# Patient Record
Sex: FEMALE | Race: WHITE | NOT HISPANIC OR LATINO | URBAN - METROPOLITAN AREA
[De-identification: names, ages, dates, MRNs, and addresses within clinical notes are randomized per-mention and may not be internally consistent; named-entity substitution may affect disease eponyms.]

---

## 2021-12-25 ENCOUNTER — EMERGENCY (EMERGENCY)
Facility: HOSPITAL | Age: 61
LOS: 0 days | Discharge: HOME | End: 2021-12-25
Attending: EMERGENCY MEDICINE | Admitting: EMERGENCY MEDICINE
Payer: COMMERCIAL

## 2021-12-25 VITALS
RESPIRATION RATE: 19 BRPM | TEMPERATURE: 99 F | SYSTOLIC BLOOD PRESSURE: 162 MMHG | WEIGHT: 147.93 LBS | HEART RATE: 76 BPM | DIASTOLIC BLOOD PRESSURE: 102 MMHG | OXYGEN SATURATION: 96 %

## 2021-12-25 VITALS
HEART RATE: 72 BPM | OXYGEN SATURATION: 98 % | RESPIRATION RATE: 18 BRPM | SYSTOLIC BLOOD PRESSURE: 152 MMHG | DIASTOLIC BLOOD PRESSURE: 88 MMHG

## 2021-12-25 DIAGNOSIS — Z90.710 ACQUIRED ABSENCE OF BOTH CERVIX AND UTERUS: Chronic | ICD-10-CM

## 2021-12-25 DIAGNOSIS — Z94.6 BONE TRANSPLANT STATUS: Chronic | ICD-10-CM

## 2021-12-25 DIAGNOSIS — Z88.0 ALLERGY STATUS TO PENICILLIN: ICD-10-CM

## 2021-12-25 DIAGNOSIS — Z91.018 ALLERGY TO OTHER FOODS: ICD-10-CM

## 2021-12-25 DIAGNOSIS — S61.412A LACERATION WITHOUT FOREIGN BODY OF LEFT HAND, INITIAL ENCOUNTER: ICD-10-CM
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
## 2021-12-25 DIAGNOSIS — Z91.013 ALLERGY TO SEAFOOD: ICD-10-CM

## 2021-12-25 DIAGNOSIS — Z88.8 ALLERGY STATUS TO OTHER DRUGS, MEDICAMENTS AND BIOLOGICAL SUBSTANCES: ICD-10-CM

## 2021-12-25 DIAGNOSIS — Z23 ENCOUNTER FOR IMMUNIZATION: ICD-10-CM

## 2021-12-25 DIAGNOSIS — Z98.890 OTHER SPECIFIED POSTPROCEDURAL STATES: Chronic | ICD-10-CM

## 2021-12-25 DIAGNOSIS — Z90.49 ACQUIRED ABSENCE OF OTHER SPECIFIED PARTS OF DIGESTIVE TRACT: Chronic | ICD-10-CM
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
## 2021-12-25 DIAGNOSIS — Z98.891 HISTORY OF UTERINE SCAR FROM PREVIOUS SURGERY: Chronic | ICD-10-CM

## 2021-12-25 PROCEDURE — 73130 X-RAY EXAM OF HAND: CPT | Mod: 26,LT

## 2021-12-25 PROCEDURE — 12001 RPR S/N/AX/GEN/TRNK 2.5CM/<: CPT

## 2021-12-25 PROCEDURE — 99283 EMERGENCY DEPT VISIT LOW MDM: CPT | Mod: 25

## 2021-12-25 RX ORDER — TETANUS TOXOID, REDUCED DIPHTHERIA TOXOID AND ACELLULAR PERTUSSIS VACCINE, ADSORBED 5; 2.5; 8; 8; 2.5 [IU]/.5ML; [IU]/.5ML; UG/.5ML; UG/.5ML; UG/.5ML
0.5 SUSPENSION INTRAMUSCULAR ONCE
Refills: 0 | Status: COMPLETED | OUTPATIENT
Start: 2021-12-25 | End: 2021-12-25

## 2021-12-25 RX ADMIN — TETANUS TOXOID, REDUCED DIPHTHERIA TOXOID AND ACELLULAR PERTUSSIS VACCINE, ADSORBED 0.5 MILLILITER(S): 5; 2.5; 8; 8; 2.5 SUSPENSION INTRAMUSCULAR at 14:16

## 2021-12-25 NOTE — ED PROVIDER NOTE - NS ED ROS FT
CONST: No fever, chills or bodyaches  EYES: No pain, redness, drainage or visual changes.  ENT: No ear pain or discharge, nasal discharge or congestion. No sore throat  CARD: No chest pain, palpitations  RESP: No SOB, cough, hemoptysis. No hx of asthma or COPD  GI: No abdominal pain, N/V/D  : No urinary symptoms  MS: No joint pain, back pain or extremity pain/injury  SKIN: No rashes. (+) left hand laceration  NEURO: No headache, dizziness, paresthesias or LOC

## 2021-12-25 NOTE — ED PROVIDER NOTE - PROGRESS NOTE DETAILS
FF: pt laceration was anesthetized, cleansed and repaired. dressing placed. pt advised of return precautions discussed at bedside. agreeable to dc. advised to return in 10-12 days for suture removal.

## 2021-12-25 NOTE — ED PROVIDER NOTE - OBJECTIVE STATEMENT
62 y/o female with no significant PMH presents to the ED for evaluation of left hand laceration s/p a glass breaking and a piece of it cut her left hand. pt cannot recall her last tetanus vaccine. pt denies weakness, numbness, or tingling.

## 2021-12-25 NOTE — ED PROVIDER NOTE - CLINICAL SUMMARY MEDICAL DECISION MAKING FREE TEXT BOX
60 y/o female with no significant PMH presents to the ED for evaluation of left hand laceration s/p a glass breaking and a piece of it cut her left hand. Pt cannot recall her last tetanus vaccine. Pt denies weakness, numbness, or tingling. Agree with PA's exam. Lac repaired. WIll d/c.

## 2021-12-25 NOTE — ED PROVIDER NOTE - PHYSICAL EXAMINATION
Physical Exam    Vital Signs: I have reviewed the initial vital signs.  Constitutional: well-nourished, appears stated age, no acute distress  Eyes: Conjunctiva pink, Sclera clear  Cardiovascular: S1 and S2, regular rate, regular rhythm, well-perfused extremities, radial pulses equal and 2+ b/l. good capillary refill <2 seconds.   Respiratory: unlabored respiratory effort, clear to auscultation bilaterally no wheezing, rales and rhonchi. pt is speaking full sentences. no accessory muscle use.   Musculoskeletal: FROM of digits b/l.  Integumentary: warm, dry, no rash. ~2cm laceration to the left distal 5th metacarpal with no visible foreign body. no tendon involvement.   Neurologic: awake, alert, cranial nerves II-XII grossly intact, extremities’ motor and sensory functions grossly intact. median, radial, and ulnar nerve motor and sensory function grossly intact b/l. steady gait.   Psychiatric: appropriate mood, appropriate affect

## 2021-12-25 NOTE — ED PROVIDER NOTE - PATIENT PORTAL LINK FT
You can access the FollowMyHealth Patient Portal offered by [facility] by registering at the following website: [email or web address]. By joining [facility]’s FollowMyHealth portal, you will also be able to view your health information using other applications (apps) compatible with our system.

## 2021-12-25 NOTE — ED ADULT TRIAGE NOTE - WEIGHT IN LBS
147.9 TOKEN:'[id number]',FREE:[LAST:[Rena],FIRST:[],PHONE:[(   )    -],FAX:[(   )    -],ADDRESS:[PCP]]

## 2021-12-25 NOTE — ED ADULT NURSE NOTE - NSICDXPASTSURGICALHX_GEN_ALL_CORE_FT
PAST SURGICAL HISTORY:  H/O bone transplant     H/O colectomy     H/O myomectomy     H/O: hysterectomy     S/P

## 2021-12-25 NOTE — ED PROVIDER NOTE - ATTENDING CONTRIBUTION TO CARE
62 y/o female with no significant PMH presents to the ED for evaluation of left hand laceration s/p a glass breaking and a piece of it cut her left hand. Pt cannot recall her last tetanus vaccine. Pt denies weakness, numbness, or tingling. Agree with PA's exam. Lac repaired. WIll d/c.

## 2021-12-25 NOTE — ED PROVIDER NOTE - NSFOLLOWUPINSTRUCTIONS_ED_ALL_ED_FT
Laceration    A laceration is a cut that goes through all of the layers of the skin and into the tissue that is right under the skin. Some lacerations heal on their own. Others need to be closed with stitches (sutures), staples, skin adhesive strips, or skin glue. Proper laceration care minimizes the risk of infection and helps the laceration to heal better.     SEEK IMMEDIATE MEDICAL CARE IF YOU HAVE THE FOLLOWING SYMPTOMS: swelling around the wound, worsening pain, drainage from the wound, red streaking going away from your wound, inability to move finger or toe near the laceration, or discoloration of skin near the laceration.     RETURN FOR SUTURE REMOVAL IN 10-12 DAYS

## 2022-11-30 NOTE — ED ADULT NURSE NOTE - NS ED NURSE LEVEL OF CONSCIOUSNESS ORIENTATION
Addended by: VIKAS BLAKELY on: 11/30/2022 01:50 PM     Modules accepted: Orders    
Oriented - self; Oriented - place; Oriented - time

## 2023-06-12 ENCOUNTER — INPATIENT (INPATIENT)
Facility: HOSPITAL | Age: 63
LOS: 1 days | Discharge: HOME | DRG: 683 | End: 2023-06-14
Attending: STUDENT IN AN ORGANIZED HEALTH CARE EDUCATION/TRAINING PROGRAM | Admitting: INTERNAL MEDICINE
Payer: COMMERCIAL

## 2023-06-12 VITALS
TEMPERATURE: 98 F | SYSTOLIC BLOOD PRESSURE: 140 MMHG | RESPIRATION RATE: 18 BRPM | DIASTOLIC BLOOD PRESSURE: 81 MMHG | HEART RATE: 83 BPM | HEIGHT: 64 IN | OXYGEN SATURATION: 97 % | WEIGHT: 149.91 LBS

## 2023-06-12 DIAGNOSIS — Z98.890 OTHER SPECIFIED POSTPROCEDURAL STATES: Chronic | ICD-10-CM

## 2023-06-12 DIAGNOSIS — Z94.6 BONE TRANSPLANT STATUS: Chronic | ICD-10-CM

## 2023-06-12 DIAGNOSIS — Z90.710 ACQUIRED ABSENCE OF BOTH CERVIX AND UTERUS: Chronic | ICD-10-CM

## 2023-06-12 DIAGNOSIS — N17.9 ACUTE KIDNEY FAILURE, UNSPECIFIED: ICD-10-CM

## 2023-06-12 DIAGNOSIS — Z98.891 HISTORY OF UTERINE SCAR FROM PREVIOUS SURGERY: Chronic | ICD-10-CM

## 2023-06-12 DIAGNOSIS — Z90.49 ACQUIRED ABSENCE OF OTHER SPECIFIED PARTS OF DIGESTIVE TRACT: Chronic | ICD-10-CM

## 2023-06-12 PROBLEM — K51.90 ULCERATIVE COLITIS, UNSPECIFIED, WITHOUT COMPLICATIONS: Chronic | Status: ACTIVE | Noted: 2021-12-25

## 2023-06-12 LAB
ALBUMIN SERPL ELPH-MCNC: 4.5 G/DL — SIGNIFICANT CHANGE UP (ref 3.5–5.2)
ALP SERPL-CCNC: 91 U/L — SIGNIFICANT CHANGE UP (ref 30–115)
ALT FLD-CCNC: 16 U/L — SIGNIFICANT CHANGE UP (ref 0–41)
ANION GAP SERPL CALC-SCNC: 16 MMOL/L — HIGH (ref 7–14)
AST SERPL-CCNC: 22 U/L — SIGNIFICANT CHANGE UP (ref 0–41)
BASE EXCESS BLDV CALC-SCNC: -7.5 MMOL/L — LOW (ref -2–3)
BASOPHILS # BLD AUTO: 0.01 K/UL — SIGNIFICANT CHANGE UP (ref 0–0.2)
BASOPHILS NFR BLD AUTO: 0.2 % — SIGNIFICANT CHANGE UP (ref 0–1)
BILIRUB SERPL-MCNC: 0.4 MG/DL — SIGNIFICANT CHANGE UP (ref 0.2–1.2)
BUN SERPL-MCNC: 47 MG/DL — HIGH (ref 10–20)
C DIFF BY PCR RESULT: SIGNIFICANT CHANGE UP
CA-I SERPL-SCNC: 1.13 MMOL/L — LOW (ref 1.15–1.33)
CALCIUM SERPL-MCNC: 9.6 MG/DL — SIGNIFICANT CHANGE UP (ref 8.4–10.5)
CHLORIDE SERPL-SCNC: 94 MMOL/L — LOW (ref 98–110)
CO2 SERPL-SCNC: 18 MMOL/L — SIGNIFICANT CHANGE UP (ref 17–32)
CREAT SERPL-MCNC: 1.6 MG/DL — HIGH (ref 0.7–1.5)
EGFR: 36 ML/MIN/1.73M2 — LOW
EOSINOPHIL # BLD AUTO: 0.06 K/UL — SIGNIFICANT CHANGE UP (ref 0–0.7)
EOSINOPHIL NFR BLD AUTO: 0.9 % — SIGNIFICANT CHANGE UP (ref 0–8)
GAS PNL BLDV: 127 MMOL/L — LOW (ref 136–145)
GAS PNL BLDV: SIGNIFICANT CHANGE UP
GLUCOSE SERPL-MCNC: 106 MG/DL — HIGH (ref 70–99)
HCO3 BLDV-SCNC: 20 MMOL/L — LOW (ref 22–29)
HCT VFR BLD CALC: 49 % — HIGH (ref 37–47)
HCT VFR BLDA CALC: 45 % — SIGNIFICANT CHANGE UP (ref 39–51)
HGB BLD CALC-MCNC: 15.1 G/DL — SIGNIFICANT CHANGE UP (ref 12.6–17.4)
HGB BLD-MCNC: 16.6 G/DL — HIGH (ref 12–16)
IMM GRANULOCYTES NFR BLD AUTO: 0.5 % — HIGH (ref 0.1–0.3)
LACTATE BLDV-MCNC: 1.1 MMOL/L — SIGNIFICANT CHANGE UP (ref 0.5–2)
LIDOCAIN IGE QN: 37 U/L — SIGNIFICANT CHANGE UP (ref 7–60)
LYMPHOCYTES # BLD AUTO: 1.06 K/UL — LOW (ref 1.2–3.4)
LYMPHOCYTES # BLD AUTO: 16.2 % — LOW (ref 20.5–51.1)
MCHC RBC-ENTMCNC: 28.9 PG — SIGNIFICANT CHANGE UP (ref 27–31)
MCHC RBC-ENTMCNC: 33.9 G/DL — SIGNIFICANT CHANGE UP (ref 32–37)
MCV RBC AUTO: 85.4 FL — SIGNIFICANT CHANGE UP (ref 81–99)
MONOCYTES # BLD AUTO: 0.7 K/UL — HIGH (ref 0.1–0.6)
MONOCYTES NFR BLD AUTO: 10.7 % — HIGH (ref 1.7–9.3)
NEUTROPHILS # BLD AUTO: 4.7 K/UL — SIGNIFICANT CHANGE UP (ref 1.4–6.5)
NEUTROPHILS NFR BLD AUTO: 71.5 % — SIGNIFICANT CHANGE UP (ref 42.2–75.2)
NRBC # BLD: 0 /100 WBCS — SIGNIFICANT CHANGE UP (ref 0–0)
PCO2 BLDV: 45 MMHG — HIGH (ref 39–42)
PH BLDV: 7.25 — LOW (ref 7.32–7.43)
PLATELET # BLD AUTO: 333 K/UL — SIGNIFICANT CHANGE UP (ref 130–400)
PMV BLD: 9.8 FL — SIGNIFICANT CHANGE UP (ref 7.4–10.4)
PO2 BLDV: 26 MMHG — SIGNIFICANT CHANGE UP
POTASSIUM BLDV-SCNC: 4.6 MMOL/L — SIGNIFICANT CHANGE UP (ref 3.5–5.1)
POTASSIUM SERPL-MCNC: 5.2 MMOL/L — HIGH (ref 3.5–5)
POTASSIUM SERPL-SCNC: 5.2 MMOL/L — HIGH (ref 3.5–5)
PROT SERPL-MCNC: 7.6 G/DL — SIGNIFICANT CHANGE UP (ref 6–8)
RBC # BLD: 5.74 M/UL — HIGH (ref 4.2–5.4)
RBC # FLD: 13.2 % — SIGNIFICANT CHANGE UP (ref 11.5–14.5)
SAO2 % BLDV: 40.5 % — SIGNIFICANT CHANGE UP
SODIUM SERPL-SCNC: 128 MMOL/L — LOW (ref 135–146)
WBC # BLD: 6.56 K/UL — SIGNIFICANT CHANGE UP (ref 4.8–10.8)
WBC # FLD AUTO: 6.56 K/UL — SIGNIFICANT CHANGE UP (ref 4.8–10.8)

## 2023-06-12 PROCEDURE — 99221 1ST HOSP IP/OBS SF/LOW 40: CPT

## 2023-06-12 PROCEDURE — G0378: CPT

## 2023-06-12 PROCEDURE — 36415 COLL VENOUS BLD VENIPUNCTURE: CPT

## 2023-06-12 PROCEDURE — 85025 COMPLETE CBC W/AUTO DIFF WBC: CPT

## 2023-06-12 PROCEDURE — 86803 HEPATITIS C AB TEST: CPT

## 2023-06-12 PROCEDURE — 99285 EMERGENCY DEPT VISIT HI MDM: CPT

## 2023-06-12 PROCEDURE — 80048 BASIC METABOLIC PNL TOTAL CA: CPT

## 2023-06-12 PROCEDURE — 87507 IADNA-DNA/RNA PROBE TQ 12-25: CPT

## 2023-06-12 RX ORDER — SODIUM CHLORIDE 9 MG/ML
1000 INJECTION INTRAMUSCULAR; INTRAVENOUS; SUBCUTANEOUS
Refills: 0 | Status: DISCONTINUED | OUTPATIENT
Start: 2023-06-12 | End: 2023-06-14

## 2023-06-12 RX ORDER — LANOLIN ALCOHOL/MO/W.PET/CERES
5 CREAM (GRAM) TOPICAL AT BEDTIME
Refills: 0 | Status: DISCONTINUED | OUTPATIENT
Start: 2023-06-12 | End: 2023-06-14

## 2023-06-12 RX ORDER — ONDANSETRON 8 MG/1
4 TABLET, FILM COATED ORAL EVERY 8 HOURS
Refills: 0 | Status: DISCONTINUED | OUTPATIENT
Start: 2023-06-12 | End: 2023-06-14

## 2023-06-12 RX ORDER — ACETAMINOPHEN 500 MG
650 TABLET ORAL EVERY 6 HOURS
Refills: 0 | Status: DISCONTINUED | OUTPATIENT
Start: 2023-06-12 | End: 2023-06-14

## 2023-06-12 RX ORDER — SODIUM CHLORIDE 9 MG/ML
1000 INJECTION INTRAMUSCULAR; INTRAVENOUS; SUBCUTANEOUS ONCE
Refills: 0 | Status: COMPLETED | OUTPATIENT
Start: 2023-06-12 | End: 2023-06-12

## 2023-06-12 RX ORDER — SODIUM CHLORIDE 9 MG/ML
1000 INJECTION, SOLUTION INTRAVENOUS ONCE
Refills: 0 | Status: COMPLETED | OUTPATIENT
Start: 2023-06-12 | End: 2023-06-12

## 2023-06-12 RX ADMIN — SODIUM CHLORIDE 1000 MILLILITER(S): 9 INJECTION, SOLUTION INTRAVENOUS at 15:44

## 2023-06-12 RX ADMIN — SODIUM CHLORIDE 1000 MILLILITER(S): 9 INJECTION INTRAMUSCULAR; INTRAVENOUS; SUBCUTANEOUS at 18:23

## 2023-06-12 NOTE — PATIENT PROFILE ADULT - FALL HARM RISK - UNIVERSAL INTERVENTIONS
Bed in lowest position, wheels locked, appropriate side rails in place/Call bell, personal items and telephone in reach/Instruct patient to call for assistance before getting out of bed or chair/Non-slip footwear when patient is out of bed/[place] to call system/Physically safe environment - no spills, clutter or unnecessary equipment/Purposeful Proactive Rounding/Room/bathroom lighting operational, light cord in reach

## 2023-06-12 NOTE — ED ADULT NURSE NOTE - NSFALLUNIVINTERV_ED_ALL_ED
Bed/Stretcher in lowest position, wheels locked, appropriate side rails in place/Call bell, personal items and telephone in reach/Instruct patient to call for assistance before getting out of bed/chair/stretcher/Non-slip footwear applied when patient is off stretcher/[place] to call system/Physically safe environment - no spills, clutter or unnecessary equipment/Purposeful proactive rounding/Room/bathroom lighting operational, light cord in reach

## 2023-06-12 NOTE — H&P ADULT - NS ATTEND AMEND GEN_ALL_CORE FT
Seen soon after arrival to medical floor. Suspect kidney dysfunction due to prerenal cause (diarrhea). Her usual gastroenterologist is in [place]

## 2023-06-12 NOTE — PATIENT PROFILE ADULT - NSPROGENOTHERPROVIDER_GEN_A_NUR
none How Severe Are Your Spot(S)?: mild Have Your Spot(S) Been Treated In The Past?: has not been treated Hpi Title: Evaluation of Skin Lesions

## 2023-06-12 NOTE — ED PROVIDER NOTE - OBJECTIVE STATEMENT
63-year-old female, history of ulcerative colitis, presents with diarrhea, started 3 days ago after taking 5 days of Z-Allan for sinusitis infection no belly pain no blood in his stool, no syncopal episode, tolerating p.o., no nausea no vomiting.  No fever. 63-year-old female, history of ulcerative colitis s/p colon resection with J tube, presents with diarrhea, started 3 days ago after taking 5 days of Z-Allan for sinusitis infection no belly pain no blood in his stool, no syncopal episode, tolerating p.o., no nausea no vomiting.  No fever.

## 2023-06-12 NOTE — H&P ADULT - HISTORY OF PRESENT ILLNESS
62 y/o female  63-year-old female, history of ulcerative colitis s/p colon resection with J tube, presents with diarrhea, started 3 days ago after taking 5 days of Z-Allan for sinusitis infection no belly pain no blood in his stool, no syncopal episode, tolerating p.o., no nausea no vomiting.  No fever.  63-year-old female, history of ulcerative colitis s/p colon resection with J tube, presents with diarrhea, started 3 days ago after taking 5 days of Z-Allan for sinusitis infection no belly pain no blood in his stool, no syncopal episode, tolerating p.o., no nausea no vomiting.  No fever.      No home meds

## 2023-06-12 NOTE — H&P ADULT - ASSESSMENT
63-year-old female, history of ulcerative colitis s/p colon resection with J tube, presents with diarrhea, started 3 days ago after taking 5 days of Z-Allan for sinusitis infection no belly pain no blood in his stool, no syncopal episode, tolerating p.o., no nausea no vomiting.  No fever.  63-year-old female, history of ulcerative colitis s/p colon resection with J tube, presents with diarrhea, started 3 days ago after taking 5 days of Z-Allan for sinusitis infection no belly pain no blood in his stool, no syncopal episode, tolerating p.o., no nausea no vomiting.  No fever.      # ARF  - IV  - repeats labs in am    # UC  - GI consult      63-year-old female, history of ulcerative colitis s/p colon resection with J tube, presents with diarrhea, started 3 days ago after taking 5 days of Z-Allan for sinusitis infection no belly pain no blood in his stool, no syncopal episode, tolerating p.o., no nausea no vomiting.  No fever.      # ARF  - IV  - repeats labs in am    # UC  - GI consult  - diet; FULL liquid advance as tolerated.

## 2023-06-12 NOTE — ED PROVIDER NOTE - CLINICAL SUMMARY MEDICAL DECISION MAKING FREE TEXT BOX
patient s/p colon resection presents for evaluation of multiple episodes of watery diarrhea, patient is taking po azithromycin with no relief continues to be symptomatic in the Ed however has no guarding no rebound on abdominal exam.  I will admit for further workup at this time considering her symptoms are persistent as well as her laboratory abnormalities

## 2023-06-12 NOTE — ED PROVIDER NOTE - ATTENDING CONTRIBUTION TO CARE
63-year-old female history of UC status post J-pouch in 2004 complains of copious watery nonbloody diarrhea with leg cramps, no vomiting or abdominal pain, had low-grade fever over the weekend with some sinus congestion was started on Z-Allan, on exam vitals appreciated, nontoxic, dry mucous membranes, abdomen soft nontender nondistended, will check labs and hydrate

## 2023-06-13 LAB
ANION GAP SERPL CALC-SCNC: 11 MMOL/L — SIGNIFICANT CHANGE UP (ref 7–14)
BASOPHILS # BLD AUTO: 0.02 K/UL — SIGNIFICANT CHANGE UP (ref 0–0.2)
BASOPHILS NFR BLD AUTO: 0.4 % — SIGNIFICANT CHANGE UP (ref 0–1)
BUN SERPL-MCNC: 31 MG/DL — HIGH (ref 10–20)
CALCIUM SERPL-MCNC: 8.9 MG/DL — SIGNIFICANT CHANGE UP (ref 8.4–10.5)
CHLORIDE SERPL-SCNC: 106 MMOL/L — SIGNIFICANT CHANGE UP (ref 98–110)
CO2 SERPL-SCNC: 21 MMOL/L — SIGNIFICANT CHANGE UP (ref 17–32)
CREAT SERPL-MCNC: 1 MG/DL — SIGNIFICANT CHANGE UP (ref 0.7–1.5)
EGFR: 63 ML/MIN/1.73M2 — SIGNIFICANT CHANGE UP
EOSINOPHIL # BLD AUTO: 0.08 K/UL — SIGNIFICANT CHANGE UP (ref 0–0.7)
EOSINOPHIL NFR BLD AUTO: 1.5 % — SIGNIFICANT CHANGE UP (ref 0–8)
GLUCOSE SERPL-MCNC: 77 MG/DL — SIGNIFICANT CHANGE UP (ref 70–99)
HCT VFR BLD CALC: 44.3 % — SIGNIFICANT CHANGE UP (ref 37–47)
HCV AB S/CO SERPL IA: 0.03 COI — SIGNIFICANT CHANGE UP
HCV AB SERPL-IMP: SIGNIFICANT CHANGE UP
HGB BLD-MCNC: 14.6 G/DL — SIGNIFICANT CHANGE UP (ref 12–16)
IMM GRANULOCYTES NFR BLD AUTO: 0.2 % — SIGNIFICANT CHANGE UP (ref 0.1–0.3)
LYMPHOCYTES # BLD AUTO: 1.29 K/UL — SIGNIFICANT CHANGE UP (ref 1.2–3.4)
LYMPHOCYTES # BLD AUTO: 24.6 % — SIGNIFICANT CHANGE UP (ref 20.5–51.1)
MCHC RBC-ENTMCNC: 28.7 PG — SIGNIFICANT CHANGE UP (ref 27–31)
MCHC RBC-ENTMCNC: 33 G/DL — SIGNIFICANT CHANGE UP (ref 32–37)
MCV RBC AUTO: 87 FL — SIGNIFICANT CHANGE UP (ref 81–99)
MONOCYTES # BLD AUTO: 0.56 K/UL — SIGNIFICANT CHANGE UP (ref 0.1–0.6)
MONOCYTES NFR BLD AUTO: 10.7 % — HIGH (ref 1.7–9.3)
NEUTROPHILS # BLD AUTO: 3.28 K/UL — SIGNIFICANT CHANGE UP (ref 1.4–6.5)
NEUTROPHILS NFR BLD AUTO: 62.6 % — SIGNIFICANT CHANGE UP (ref 42.2–75.2)
NRBC # BLD: 0 /100 WBCS — SIGNIFICANT CHANGE UP (ref 0–0)
PLATELET # BLD AUTO: 282 K/UL — SIGNIFICANT CHANGE UP (ref 130–400)
PMV BLD: 10.2 FL — SIGNIFICANT CHANGE UP (ref 7.4–10.4)
POTASSIUM SERPL-MCNC: 4.2 MMOL/L — SIGNIFICANT CHANGE UP (ref 3.5–5)
POTASSIUM SERPL-SCNC: 4.2 MMOL/L — SIGNIFICANT CHANGE UP (ref 3.5–5)
RBC # BLD: 5.09 M/UL — SIGNIFICANT CHANGE UP (ref 4.2–5.4)
RBC # FLD: 13.2 % — SIGNIFICANT CHANGE UP (ref 11.5–14.5)
SODIUM SERPL-SCNC: 138 MMOL/L — SIGNIFICANT CHANGE UP (ref 135–146)
WBC # BLD: 5.24 K/UL — SIGNIFICANT CHANGE UP (ref 4.8–10.8)
WBC # FLD AUTO: 5.24 K/UL — SIGNIFICANT CHANGE UP (ref 4.8–10.8)

## 2023-06-13 PROCEDURE — 99222 1ST HOSP IP/OBS MODERATE 55: CPT

## 2023-06-13 PROCEDURE — 99232 SBSQ HOSP IP/OBS MODERATE 35: CPT

## 2023-06-13 RX ORDER — HEPARIN SODIUM 5000 [USP'U]/ML
5000 INJECTION INTRAVENOUS; SUBCUTANEOUS EVERY 12 HOURS
Refills: 0 | Status: DISCONTINUED | OUTPATIENT
Start: 2023-06-13 | End: 2023-06-14

## 2023-06-13 RX ADMIN — SODIUM CHLORIDE 75 MILLILITER(S): 9 INJECTION INTRAMUSCULAR; INTRAVENOUS; SUBCUTANEOUS at 21:12

## 2023-06-13 RX ADMIN — SODIUM CHLORIDE 100 MILLILITER(S): 9 INJECTION INTRAMUSCULAR; INTRAVENOUS; SUBCUTANEOUS at 05:16

## 2023-06-13 NOTE — CONSULT NOTE ADULT - SUBJECTIVE AND OBJECTIVE BOX
Chief complaint/Reason for consult: diarrhea     HPI:   63-year-old female, history of ulcerative colitis s/p colon resection with J pouch, presents with diarrhea, started 3 days ago after taking 5 days of Z-Allan for sinusitis infection no belly pain no blood in his stool, no syncopal episode, tolerating p.o., no nausea no vomiting.  No fever.      No home meds (2023 19:57)    GI updates: 63yFemale pmh UC in past s/p Colectomy with j-pouch had sinusitis and used azithromycin and developed diarrhea. Patient is due for pouchoscopy soon. PAtient reports diarrhea started Friday and has significantly decreased. Patient denies nausea, vomiting, hematemesis, melena, blood in stool, constipation, abdominal pain. +diarrhea      PAST MEDICAL & SURGICAL HISTORY:   Ulcerative colitis      S/P       H/O: hysterectomy      H/O bone transplant      H/O colectomy      H/O myomectomy            Family history:  FAMILY HISTORY:    No GI cancers in first or second degree relatives    Social History: No smoking. No alcohol. No illegal drug use.    Allergies:   penicillin (Anaphylaxis)  Nuts (Anaphylaxis)  aspirin (Anaphylaxis)  propofol (Anaphylaxis)  Soy (Anaphylaxis)  Bananas (Anaphylaxis)  shellfish (Anaphylaxis)  Intolerances    MEDICATIONS  (STANDING):  heparin   Injectable 5000 Unit(s) SubCutaneous every 12 hours  sodium chloride 0.9%. 1000 milliLiter(s) (75 mL/Hr) IV Continuous <Continuous>    MEDICATIONS  (PRN):  acetaminophen     Tablet .. 650 milliGRAM(s) Oral every 6 hours PRN Temp greater or equal to 38C (100.4F), Mild Pain (1 - 3)  aluminum hydroxide/magnesium hydroxide/simethicone Suspension 30 milliLiter(s) Oral every 4 hours PRN Dyspepsia  melatonin 5 milliGRAM(s) Oral at bedtime PRN Insomnia  ondansetron Injectable 4 milliGRAM(s) IV Push every 8 hours PRN Nausea and/or Vomiting      REVIEW OF SYSTEMS  General:  No weight loss, fevers, or chills.  Eyes:  No reported pain or visual changes  ENT:  No sore throat or runny nose.  NECK: No stiffness or lymphadenopathy  CV:  No chest pain or palpitations.  Resp:  No shortness of breath, cough, wheezing or hemoptysis  GI:  No abdominal pain, nausea, vomiting, dysphagia, diarrhea or constipation. No rectal bleeding, melena, or hematemesis.  Muscle:  No aches or weakness  Neuro:  No tingling, numbness       VITALS:   T(F): 96.8 (23 @ 04:15), Max: 98.5 (23 @ 14:51)  HR: 65 (23 @ 04:15) (65 - 92)  BP: 123/62 (23 @ 04:15) (123/62 - 140/81)  RR: 18 (23 @ 04:15) (18 - 18)  SpO2: 99% (23 @ 20:46) (97% - 99%)    PHYSICAL EXAM:  GENERAL: AAOx3, no acute distress.  HEAD:  Atraumatic, Normocephalic  EYES: conjunctiva and sclera clear  NECK: Supple, No thyromegaly   CHEST/LUNG: Clear to auscultation bilaterally; No wheeze, rhonchi, or rales  HEART: Regular rate and rhythm; normal S1, S2, No murmurs.  ABDOMEN: Soft, nontender, nondistended; Bowel sounds present  NEUROLOGY: No asterixis or tremor  SKIN: Intact, no jaundice          LABS:      138  |  106  |  31<H>  ----------------------------<  77  4.2   |  21  |  1.0    Ca    8.9      2023 06:29    TPro  7.6  /  Alb  4.5  /  TBili  0.4  /  DBili  x   /  AST  22  /  ALT  16  /  AlkPhos  91                            14.6   5.24  )-----------( 282      ( 2023 06:29 )             44.3     LIVER FUNCTIONS - ( 2023 16:16 )  Alb: 4.5 g/dL / Pro: 7.6 g/dL / ALK PHOS: 91 U/L / ALT: 16 U/L / AST: 22 U/L / GGT: x               IMAGING:    none     Chief complaint/Reason for consult: diarrhea     HPI:   63-year-old female, history of ulcerative colitis s/p colon resection with J pouch, presents with diarrhea, started 3 days ago after taking 5 days of Z-Allan for sinusitis infection no belly pain no blood in his stool, no syncopal episode, tolerating p.o., no nausea no vomiting.  No fever.      No home meds (2023 19:57)    GI updates: 63yFemale pmh UC in past s/p Colectomy with j-pouch had sinusitis and used azithromycin and developed diarrhea.   Patient is due for pouchoscopy soon.   PAtient reports diarrhea started Friday and has significantly decreased. Patient denies nausea, vomiting, hematemesis, melena, blood in stool, constipation, abdominal pain. +diarrhea      PAST MEDICAL & SURGICAL HISTORY:   Ulcerative colitis      S/P       H/O: hysterectomy      H/O bone transplant      H/O colectomy      H/O myomectomy            Family history:  FAMILY HISTORY:    No GI cancers in first or second degree relatives    Social History: No smoking. No alcohol. No illegal drug use.    Allergies:   penicillin (Anaphylaxis)  Nuts (Anaphylaxis)  aspirin (Anaphylaxis)  propofol (Anaphylaxis)  Soy (Anaphylaxis)  Bananas (Anaphylaxis)  shellfish (Anaphylaxis)  Intolerances    MEDICATIONS  (STANDING):  heparin   Injectable 5000 Unit(s) SubCutaneous every 12 hours  sodium chloride 0.9%. 1000 milliLiter(s) (75 mL/Hr) IV Continuous <Continuous>    MEDICATIONS  (PRN):  acetaminophen     Tablet .. 650 milliGRAM(s) Oral every 6 hours PRN Temp greater or equal to 38C (100.4F), Mild Pain (1 - 3)  aluminum hydroxide/magnesium hydroxide/simethicone Suspension 30 milliLiter(s) Oral every 4 hours PRN Dyspepsia  melatonin 5 milliGRAM(s) Oral at bedtime PRN Insomnia  ondansetron Injectable 4 milliGRAM(s) IV Push every 8 hours PRN Nausea and/or Vomiting      REVIEW OF SYSTEMS  General:  No weight loss, fevers, or chills.  Eyes:  No reported pain or visual changes  ENT:  No sore throat or runny nose.  NECK: No stiffness or lymphadenopathy  CV:  No chest pain or palpitations.  Resp:  No shortness of breath, cough, wheezing or hemoptysis  GI:  No abdominal pain, nausea, vomiting, dysphagia, diarrhea or constipation. No rectal bleeding, melena, or hematemesis.  Muscle:  No aches or weakness  Neuro:  No tingling, numbness       VITALS:   T(F): 96.8 (23 @ 04:15), Max: 98.5 (23 @ 14:51)  HR: 65 (23 @ 04:15) (65 - 92)  BP: 123/62 (23 @ 04:15) (123/62 - 140/81)  RR: 18 (23 @ 04:15) (18 - 18)  SpO2: 99% (23 @ 20:46) (97% - 99%)    PHYSICAL EXAM:  GENERAL: AAOx3, no acute distress.  HEAD:  Atraumatic, Normocephalic  EYES: conjunctiva and sclera clear  NECK: Supple, No thyromegaly   CHEST/LUNG: Clear to auscultation bilaterally; No wheeze, rhonchi, or rales  HEART: Regular rate and rhythm; normal S1, S2, No murmurs.  ABDOMEN: Soft, nontender, nondistended; Bowel sounds present  NEUROLOGY: No asterixis or tremor  SKIN: Intact, no jaundice          LABS:      138  |  106  |  31<H>  ----------------------------<  77  4.2   |  21  |  1.0    Ca    8.9      2023 06:29    TPro  7.6  /  Alb  4.5  /  TBili  0.4  /  DBili  x   /  AST  22  /  ALT  16  /  AlkPhos  91                            14.6   5.24  )-----------( 282      ( 2023 06:29 )             44.3     LIVER FUNCTIONS - ( 2023 16:16 )  Alb: 4.5 g/dL / Pro: 7.6 g/dL / ALK PHOS: 91 U/L / ALT: 16 U/L / AST: 22 U/L / GGT: x               IMAGING:    none

## 2023-06-13 NOTE — CONSULT NOTE ADULT - NS ATTEND AMEND GEN_ALL_CORE FT
agree w/ above -   63 y.o F w/ hx of UC s/p total colectomy w/ J-pouch formation, presents with 3 d hx of watery diarrhea after taking 5 days of Z-Allan for sinusitis infection, found to have ANDREI on admission now improved.  sx resolved now that she is off azithromycin.   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  c diff PCR -ve.     -check GI PCR     -supportive care per primary team  -recommend VSL#3 probiotic for hx of pouchitis  -f/u with primary GI for pouchoscopy as scheduled  -advance diet  -rest of recs per above note

## 2023-06-14 VITALS
SYSTOLIC BLOOD PRESSURE: 151 MMHG | TEMPERATURE: 96 F | DIASTOLIC BLOOD PRESSURE: 63 MMHG | RESPIRATION RATE: 18 BRPM | HEART RATE: 56 BPM

## 2023-06-14 DIAGNOSIS — E87.29 OTHER ACIDOSIS: ICD-10-CM

## 2023-06-14 DIAGNOSIS — E87.1 HYPO-OSMOLALITY AND HYPONATREMIA: ICD-10-CM

## 2023-06-14 LAB
ASTROVIRUS: DETECTED
GI PCR PANEL: DETECTED

## 2023-06-14 PROCEDURE — 99239 HOSP IP/OBS DSCHRG MGMT >30: CPT

## 2023-06-14 PROCEDURE — 99233 SBSQ HOSP IP/OBS HIGH 50: CPT

## 2023-06-14 NOTE — DISCHARGE NOTE NURSING/CASE MANAGEMENT/SOCIAL WORK - NSDCPEFALRISK_GEN_ALL_CORE
For information on Fall & Injury Prevention, visit: [email or web address] OR  [email or web address] OR  https://www.cdc.gov/steadi/patient.html

## 2023-06-14 NOTE — DISCHARGE NOTE NURSING/CASE MANAGEMENT/SOCIAL WORK - NSDCVIVACCINE_GEN_ALL_CORE_FT
Tdap; 25-Dec-2021 14:16; Hoda Conner (RN); [employer]; [id number]   (Exp. Date: 18-Nov-2022); IntraMuscular; Deltoid Right.; 0.5 milliLiter(s); VIS (VIS Published: 09-May-2013, VIS Presented: 25-Dec-2021);

## 2023-06-14 NOTE — DISCHARGE NOTE NURSING/CASE MANAGEMENT/SOCIAL WORK - PATIENT PORTAL LINK FT
You can access the FollowMyHealth Patient Portal offered by [facility] by registering at the following website: [email or web address]. By joining [employer]’s FollowMyHealth portal, you will also be able to view your health information using other applications (apps) compatible with our system.

## 2023-06-14 NOTE — PROGRESS NOTE ADULT - SUBJECTIVE AND OBJECTIVE BOX
SUBJECTIVE:    Patient is a 63y old Female who presents with a chief complaint of   Currently admitted to medicine with the primary diagnosis of ANDREI (acute kidney injury)       Today is hospital day 1d. This morning she is resting comfortably in bed and reports no new issues or overnight events.     ROS:   CONSTITUTIONAL: No weakness, fevers or chills   EYES/ENT: No visual changes; No vertigo or throat pain   NECK: No pain or stiffness   RESPIRATORY: No cough, wheezing, hemoptysis; No shortness of breath   CARDIOVASCULAR: No chest pain or palpitations   GASTROINTESTINAL: No abdominal or epigastric pain. No nausea, vomiting, or hematemesis; No diarrhea or constipation. No melena or hematochezia.  GENITOURINARY: No dysuria, frequency or hematuria  NEUROLOGICAL: No numbness or weakness  SKIN: No itching, rashes      PAST MEDICAL & SURGICAL HISTORY  Ulcerative colitis    S/P     H/O: hysterectomy    H/O bone transplant    H/O colectomy    H/O myomectomy      SOCIAL HISTORY:    ALLERGIES:  penicillin (Anaphylaxis)  Nuts (Anaphylaxis)  aspirin (Anaphylaxis)  propofol (Anaphylaxis)  Soy (Anaphylaxis)  Bananas (Anaphylaxis)  shellfish (Anaphylaxis)    MEDICATIONS:  STANDING MEDICATIONS  heparin   Injectable 5000 Unit(s) SubCutaneous every 12 hours  sodium chloride 0.9%. 1000 milliLiter(s) IV Continuous <Continuous>    PRN MEDICATIONS  acetaminophen     Tablet .. 650 milliGRAM(s) Oral every 6 hours PRN  aluminum hydroxide/magnesium hydroxide/simethicone Suspension 30 milliLiter(s) Oral every 4 hours PRN  melatonin 5 milliGRAM(s) Oral at bedtime PRN  ondansetron Injectable 4 milliGRAM(s) IV Push every 8 hours PRN    VITALS:   T(F): 96.8  HR: 65  BP: 123/62  RR: 18  SpO2: 99%    LABS:  Negative for smoking/alcohol/drug use.                         14.6   5.24  )-----------( 282      ( 2023 06:29 )             44.3     06-    138  |  106  |  31<H>  ----------------------------<  77  4.2   |  21  |  1.0    Ca    8.9      2023 06:29    TPro  7.6  /  Alb  4.5  /  TBili  0.4  /  DBili  x   /  AST  22  /  ALT  16  /  AlkPhos  91  06-12      RADIOLOGY:    PHYSICAL EXAM:  GEN: No acute distress  HEENT: normocephalic, atraumatic, aniceteric  LUNGS: bl breath sounds   HEART: S1/S2 present. RRR, no murmurs  ABD: Soft, non-tender, non-distended. Bowel sounds present  EXT: no edema   NEURO: AAOX3, normal affect      ASSESSMENT AND PLAN:    62 YO F w/ PMH of ulcerative colitis s/p colon resection with internal J tube - presents with 3 day history of watery diarrhea after taking 5 days of Z-Allan for sinusitis infection    # Watery diarrhea possibly 2/2 viral gastroenteritis   - denies recent travel, sick contacts, unusual diet  - no sepsis poa  - C.diff negative   - FU GI PCR  - IVF  - Supportive Care    # ANDREI   # Hyponatremia   # Hyperkalemia   # HAGMA  possibly pre-renal in setting of dehydration / diarrhea  - IVF   - monitor bmp  - if not improving - nephrology consult    # dvt/gi ppx/diet  # dispo: acute - possibly 24 hrs   # handoff: pending gi pcr and improvement in symptoms 
63yFemale  Being followed for infectious colitis   Interval history: Patient denies nausea, vomiting, hematemesis, melena, blood in stool, diarrhea, constipation, abdominal pain. Patient reports she is back to her baseline.      PAST MEDICAL & SURGICAL HISTORY:   Ulcerative colitis      S/P       H/O: hysterectomy      H/O bone transplant      H/O colectomy      H/O myomectomy                Social History: No smoking. No alcohol. No illegal drug use.            MEDICATIONS  (STANDING):  heparin   Injectable 5000 Unit(s) SubCutaneous every 12 hours  sodium chloride 0.9%. 1000 milliLiter(s) (75 mL/Hr) IV Continuous <Continuous>    MEDICATIONS  (PRN):  acetaminophen     Tablet .. 650 milliGRAM(s) Oral every 6 hours PRN Temp greater or equal to 38C (100.4F), Mild Pain (1 - 3)  aluminum hydroxide/magnesium hydroxide/simethicone Suspension 30 milliLiter(s) Oral every 4 hours PRN Dyspepsia  melatonin 5 milliGRAM(s) Oral at bedtime PRN Insomnia  ondansetron Injectable 4 milliGRAM(s) IV Push every 8 hours PRN Nausea and/or Vomiting      Allergies:  penicillin (Anaphylaxis)  Nuts (Anaphylaxis)  aspirin (Anaphylaxis)  propofol (Anaphylaxis)  Soy (Anaphylaxis)  Bananas (Anaphylaxis)  shellfish (Anaphylaxis)            REVIEW OF SYSTEMS:  General:  No weight loss, fevers, or chills.  Eyes:  No reported pain or visual changes  ENT:  No sore throat or runny nose.  NECK: No stiffness   CV:  No chest pain or palpitations.  Resp:  No shortness of breath, cough  GI:  No abdominal pain, nausea, vomiting, dysphagia, diarrhea or constipation. No rectal bleeding, melena, or hematemesis.  Muscle:  No aches or weakness  Neuro:  No tingling, numbness         VITAL SIGNS:   T(F): 98.3 (23 @ 05:04), Max: 98.3 (23 @ 05:04)  HR: 57 (23 @ 05:04) (57 - 72)  BP: 121/70 (23 @ 05:04) (115/66 - 121/70)  RR: 18 (23 @ 05:04) (18 - 18)  SpO2: 96% (23 @ 20:30) (96% - 96%)    PHYSICAL EXAM:  GENERAL: AAOx3, no acute distress.  HEAD:  Atraumatic, Normocephalic  EYES: conjunctiva and sclera clear  NECK: Supple, no JVD or thyromegaly  CHEST/LUNG: Clear to auscultation bilaterally; No wheeze, rhonchi, or rales  HEART: Regular rate and rhythm; normal S1, S2, No murmurs.  ABDOMEN: Soft, nontender, nondistended; Bowel sounds present  NEUROLOGY: No asterixis or tremor.   SKIN: Intact, no jaundice            LABS:                        14.6   5.24  )-----------( 282      ( 2023 06:29 )             44.3     06-13    138  |  106  |  31<H>  ----------------------------<  77  4.2   |  21  |  1.0    Ca    8.9      2023 06:29    TPro  7.6  /  Alb  4.5  /  TBili  0.4  /  DBili  x   /  AST  22  /  ALT  16  /  AlkPhos  91  06-12    LIVER FUNCTIONS - ( 2023 16:16 )  Alb: 4.5 g/dL / Pro: 7.6 g/dL / ALK PHOS: 91 U/L / ALT: 16 U/L / AST: 22 U/L / GGT: x               IMAGING:    none this admission

## 2023-06-14 NOTE — PROGRESS NOTE ADULT - ASSESSMENT
63yFemale pmh UC in past s/p Colectomy with j-pouch had sinusitis and used azithromycin and developed diarrhea. Patient is due for pouchoscopy soon. PAtient reports diarrhea started Friday and has significantly decreased.    Problem 1-Diarrhea  GI PCR positive for astrovirus  patient reports diarrhea has resolved  Rec  -C-diff negative  -lactose free low residue diet  -patient to have pouchoscopy outpatient  -no acute GI intervention  -follow-up with patient's private GI team at [facility]      63yFemale pmh UC in past s/p Colectomy with j-pouch had sinusitis and used azithromycin and developed diarrhea. Patient is due for pouchoscopy soon. PAtient reports diarrhea started Friday and has significantly decreased.    Problem 1-Diarrhea  GI PCR positive for astrovirus  patient reports diarrhea has resolved  Rec  -C-diff negative  -lactose free low residue diet  -patient to have pouchoscopy outpatient  -no acute GI intervention  -follow-up with patient's private GI team at [facility]      Recall GI if needed

## 2023-06-14 NOTE — DISCHARGE NOTE PROVIDER - HOSPITAL COURSE
62 YO F w/ PMH of ulcerative colitis s/p colon resection with internal J tube - presents with 3 day history of watery diarrhea after taking 5 days of Z-Allan for sinusitis infection    # Watery diarrhea 2/2 viral gastroenteritis   - denies recent travel, sick contacts, unusual diet  - no sepsis poa  - C.diff negative   - FU GI PCR- Astrovirus   - IVF  - Supportive Care    # ANDREI - resolved  # Hyponatremia - resolved   # Hyperkalemia - resolved   # HAGMA - resolved   possibly pre-renal in setting of dehydration / diarrhea  - monitor bmp  - if not improving - nephrology consult    attending attestation:  patient seen and examined on day of dc  labs and vital signs reviewed  diarrhea improved  dc home today

## 2023-06-14 NOTE — DISCHARGE NOTE PROVIDER - NSDCCPCAREPLAN_GEN_ALL_CORE_FT
PRINCIPAL DISCHARGE DIAGNOSIS  Diagnosis: Viral gastroenteritis  Assessment and Plan of Treatment: you presented with diarrhea and were found to have "stomach flu" secondary to Astrovirus. You can take over the counter immodium as needed for diarrhea. Continue to stay hydrated. Follow up with primary care witin 1-2 weeks of discharge.      SECONDARY DISCHARGE DIAGNOSES  Diagnosis: ANDREI (acute kidney injury)  Assessment and Plan of Treatment: you had dehydration. kidney numbers were back to normal after IV hydration in the hospital. follow up with primary care    Diagnosis: Ulcerative colitis  Assessment and Plan of Treatment: follow up with your gastroenterologist as scheduled     PRINCIPAL DISCHARGE DIAGNOSIS  Diagnosis: Viral gastroenteritis  Assessment and Plan of Treatment: you presented with diarrhea and were found to have "stomach flu" secondary to Astrovirus. You can take over the counter immodium as needed for diarrhea. Continue to stay hydrated. Follow up with primary care witin 1-2 weeks of discharge.      SECONDARY DISCHARGE DIAGNOSES  Diagnosis: ANDREI (acute kidney injury)  Assessment and Plan of Treatment: you had dehydration. kidney numbers were back to normal after IV hydration in the hospital. follow up with primary care    Diagnosis: Ulcerative colitis  Assessment and Plan of Treatment: follow up with your gastroenterologist as scheduled  you have history of pouchitis and scheduled for outpatient pouchoscopy- follow up as scheduled

## 2023-06-19 DIAGNOSIS — K51.90 ULCERATIVE COLITIS, UNSPECIFIED, WITHOUT COMPLICATIONS: ICD-10-CM

## 2023-06-19 DIAGNOSIS — E87.20 ACIDOSIS, UNSPECIFIED: ICD-10-CM

## 2023-06-19 DIAGNOSIS — N17.9 ACUTE KIDNEY FAILURE, UNSPECIFIED: ICD-10-CM

## 2023-06-19 DIAGNOSIS — A08.32 ASTROVIRUS ENTERITIS: ICD-10-CM

## 2023-06-19 DIAGNOSIS — E87.5 HYPERKALEMIA: ICD-10-CM

## 2023-06-19 DIAGNOSIS — E87.1 HYPO-OSMOLALITY AND HYPONATREMIA: ICD-10-CM

## 2023-08-04 ENCOUNTER — OUTPATIENT (OUTPATIENT)
Dept: INPATIENT UNIT | Facility: HOSPITAL | Age: 63
LOS: 1 days | Discharge: HOME | End: 2023-08-04
Payer: COMMERCIAL

## 2023-08-04 VITALS
WEIGHT: 154.98 LBS | OXYGEN SATURATION: 100 % | TEMPERATURE: 98 F | HEART RATE: 83 BPM | RESPIRATION RATE: 17 BRPM | DIASTOLIC BLOOD PRESSURE: 88 MMHG | SYSTOLIC BLOOD PRESSURE: 178 MMHG | HEIGHT: 64 IN

## 2023-08-04 VITALS
OXYGEN SATURATION: 100 % | WEIGHT: 154.98 LBS | DIASTOLIC BLOOD PRESSURE: 88 MMHG | RESPIRATION RATE: 17 BRPM | HEIGHT: 64 IN | TEMPERATURE: 98 F | HEART RATE: 83 BPM | SYSTOLIC BLOOD PRESSURE: 178 MMHG

## 2023-08-04 DIAGNOSIS — Z90.49 ACQUIRED ABSENCE OF OTHER SPECIFIED PARTS OF DIGESTIVE TRACT: Chronic | ICD-10-CM

## 2023-08-04 DIAGNOSIS — Z98.890 OTHER SPECIFIED POSTPROCEDURAL STATES: Chronic | ICD-10-CM

## 2023-08-04 DIAGNOSIS — H25.11 AGE-RELATED NUCLEAR CATARACT, RIGHT EYE: ICD-10-CM

## 2023-08-04 DIAGNOSIS — Z98.891 HISTORY OF UTERINE SCAR FROM PREVIOUS SURGERY: Chronic | ICD-10-CM

## 2023-08-04 DIAGNOSIS — Z94.6 BONE TRANSPLANT STATUS: Chronic | ICD-10-CM

## 2023-08-04 DIAGNOSIS — Z90.710 ACQUIRED ABSENCE OF BOTH CERVIX AND UTERUS: Chronic | ICD-10-CM

## 2023-08-04 PROCEDURE — V2632: CPT

## 2023-08-04 NOTE — ASU PATIENT PROFILE, ADULT - FALL HARM RISK - UNIVERSAL INTERVENTIONS
Bed in lowest position, wheels locked, appropriate side rails in place/Call bell, personal items and telephone in reach/Instruct patient to call for assistance before getting out of bed or chair/Non-slip footwear when patient is out of bed/[place] to call system/Physically safe environment - no spills, clutter or unnecessary equipment/Purposeful Proactive Rounding/Room/bathroom lighting operational, light cord in reach

## 2023-08-09 DIAGNOSIS — Z91.018 ALLERGY TO OTHER FOODS: ICD-10-CM

## 2023-08-09 DIAGNOSIS — Z91.010 ALLERGY TO PEANUTS: ICD-10-CM

## 2023-08-09 DIAGNOSIS — Z88.4 ALLERGY STATUS TO ANESTHETIC AGENT: ICD-10-CM

## 2023-08-09 DIAGNOSIS — Z88.0 ALLERGY STATUS TO PENICILLIN: ICD-10-CM

## 2023-08-09 DIAGNOSIS — H25.89 OTHER AGE-RELATED CATARACT: ICD-10-CM

## 2023-08-09 DIAGNOSIS — Z91.013 ALLERGY TO SEAFOOD: ICD-10-CM

## 2023-08-09 DIAGNOSIS — Z88.6 ALLERGY STATUS TO ANALGESIC AGENT: ICD-10-CM

## 2025-02-13 NOTE — ED ADULT NURSE NOTE - HISTORY OF COVID-19 VACCINATION
CHIEF COMPLAINT    Chief Complaint   Patient presents with    Chest Pain       HPI    Patient presents via motorized wheelchair from the cardiology office with chest discomfort.  She denies pain but states she has a discomfort.  Occasionally radiates towards the left arm.  She has had it for about 5 days.  She notes some shortness of breath with it no nausea vomiting diaphoresis.  She was seen by the cardiologist who was concerned given her past history of coronary artery disease COPD she was sent down here.  She was noted to be hypertensive in the clinic.  Patient denies any new cough fevers or chills.  She does note increasing swelling in her lower extremities.  She states she has been compliant with her medications.  She took a baby aspirin today.  She is taking her hydrochlorothiazide.  Allergies    ALLERGIES:   Allergen Reactions    Bee Sting ANAPHYLAXIS    Codeine NAUSEA       Current Medications   Current Facility-Administered Medications   Medication    sodium chloride 0.9 % injection 10 mL    sodium chloride 0.9 % injection 2 mL    sodium chloride (NORMAL SALINE) 0.9 % bolus 500 mL    enoxaparin (LOVENOX) injection 30 mg    acetaminophen (TYLENOL) tablet 650 mg    Or    acetaminophen (TYLENOL) suppository 650 mg    melatonin tablet 3 mg    polyethylene glycol (MIRALAX) packet 17 g    Potassium Standard Replacement Protocol (Levels 3.5 and lower)    Magnesium Standard Replacement Protocol    Phosphorus Standard Replacement Protocol    pantoprazole (PROTONIX) EC tablet 40 mg    nitroGLYCERIN (NITROSTAT) sublingual tablet 0.4 mg    [START ON 2/14/2025] furosemide (LASIX INJECT) injection 20 mg    hydrALAZINE (APRESOLINE) injection 10 mg    amLODIPine (NORVASC) tablet 5 mg    [START ON 2/14/2025] aspirin (ECOTRIN) enteric coated tablet 81 mg    [START ON 2/14/2025] atorvastatin (LIPITOR) tablet 40 mg    carvedilol (COREG) tablet 6.25 mg    [START ON 2/14/2025] citalopram (CeleXA) tablet 10 mg    [START ON  2/14/2025] levothyroxine (SYNTHROID, LEVOTHROID) tablet 25 mcg       Past Medical History    Past Medical History:   Diagnosis Date    Benign essential hypertension 10/9/2012    Bilateral cataracts     1-2+ NS, OU (+1 nasal spokes OD/+2 nasal spokes OS in Sept 2013)    BRVO (branch retinal vein occlusion) (CMD)     OD:  s/p PRP (no macular edema or neovascularization- Sept 2013)    CAD in native artery 10/9/2012    Dr. Lyle with stenting    Colonic polyp     CRI (chronic renal insufficiency), stage 3 (moderate)  (CMD) 2/14/2020    Discoid lupus     Diverticulosis of colon (without mention of hemorrhage) 10/9/2012    DJD (degenerative joint disease)     hands and knees    History of heart artery stent 2/14/2020    Dr. Lyle    Mild depressive disorder     Mixed hyperlipidemia 10/9/2012    Osteoarthritis     upper neck pain    Osteoporosis 5/24/2012    Postpolio syndrome (CMD)     with right leg atrophy and weakness    Scoliosis     Spinal stenosis- severe L3-L5 6/6/2013    Hx of steroid injections with Dr. Samson       Surgical History    Past Surgical History:   Procedure Laterality Date    Angioplasty  2002    Appendectomy      Cataract extraction w/  intraocular lens implant Right 01/09/2020    Dr. Miller    Cataract extraction w/ intraocular lens implant Left 11/03/2016    Dr. Miller    Colonoscopy w/ polypectomy  09/17/2010    Dexa bone density axial skeleton  08/24/2004    Dexa bone density axial skeleton  06/07/2012    Esophagogastroduodenoscopy  06/15/2020    Dr. Harding     Esophagogastroduodenoscopy  08/04/2020    Dr. Harding    Hysterectomy      Hysterectomy  05/13/2010    Laser surgery of eye  July 1998    PRP: OD  (BRVO w/ rubeosis)    Laser surgery of eye  July 1999    PRP #2: OD (BRVO)     Mammo screening bilateral  05/24/2012    Pain clinic      back and neck injections    Ptca  10/14/2008    2 stents       Social History    Social History     Tobacco Use    Smoking status: Former     Current packs/day:  0.00     Types: Cigarettes     Quit date: 1982     Years since quittin.1     Passive exposure: Past    Smokeless tobacco: Never    Tobacco comments:     unsure of how many years as a smoker   Vaping Use    Vaping status: never used   Substance Use Topics    Alcohol use: Yes     Alcohol/week: 1.0 standard drink of alcohol     Types: 1 Cans of beer per week     Comment: rarely    Drug use: No       Family History    Family History   Problem Relation Age of Onset    Osteoarthritis Mother     Heart disease Mother     Hypertension Mother     Osteoarthritis Father     Diabetes Father     Heart disease Father     Hypertension Father     Cancer, Lung Brother     Heart disease Brother     Hypertension Brother     Diabetes Sister     Heart disease Sister     Hypertension Sister        REVIEW OF SYSTEMS    ALL 13 SYSTEMS REVIEWED AND NEGATIVE OR NONCONTRIBUTORY UNLESS OTHERWISE NOTED IN HPI      PHYSICAL EXAM     ED Triage Vitals [25 1258]   ED Triage Vitals Group      Temp 97.9 °F (36.6 °C)      Heart Rate 72      Resp 18      BP (!) 200/88      SpO2 98 %      EtCO2 mmHg       Height 4' 11\" (1.499 m)      Weight 114 lb 3.2 oz (51.8 kg)      Weight Scale Used Scale in bed      BMI (Calculated) 23.06      IBW/kg (Calculated) 43.2       Gen:   AAOx3 in NAD  Head:  Normocephalic, without abnormal findings  HEENT:   PERRL, EOMI without Nystagmus. Sclera anicteric   Nose:  Clear without blood or purulent mucous   Mouth: Patent without swelling.  Moist well perfused mucous membranes  Neck: No masses, thyromegaly, posterior tenderness or meningeal signs  Resp: Diminished at bases otherwise CTAB without wheezes, rhonchi, or rales.  CVS: RRR without significant murmur, rub or gallop  ABD: Normoactive BS, Nontender, No masses or organomegaly  Back: No CVA tenderness, deformities, swelling or ecchymosis  Ext:  No clubbing, cyanosis, or significant edema.  Equal UE/LE pulses. No joint swelling or erythema  Skin:  Well  perfused, no significant rashes. No jaundice  Neuro: No focal Neuro deficits with equal UE/LE strength and sensation.  Lymph:No significant Lymphadenopathy  Psych:Alert and interactive with normal affect and interaction.      Procedures      EKG:  EKG per my interpretation  Rate: 70.  Sinus rhythm first-degree AV block.  ST-T wave changes consider artifact but cannot rule out underlying lateral ischemia      Labs  Results for orders placed or performed during the hospital encounter of 02/13/25   TROPONIN I, HIGH SENSITIVITY    Specimen: Blood, Venous   Result Value    Troponin I, High Sensitivity 13   Comprehensive Metabolic Panel    Specimen: Blood, Venous   Result Value    Fasting Status     Sodium 137    Potassium 4.2    Chloride 105    Carbon Dioxide 25    Anion Gap 11    Glucose 154 (H)    BUN 26 (H)    Creatinine 1.21 (H)    Glomerular Filtration Rate 44 (L)     Comment: eGFR 30-59 mL/min/1.73m2 = Moderate decrease in kidney function. Stage 3 CKD (chronic kidney disease) or moderate kidney disease. Estimated GFR calculated using the CKD-EPI-R (2021) equation that does not include race in the creatinine calculation.    BUN/Cr 21    Calcium 9.4    Bilirubin, Total 0.4    GOT/AST 23    GPT/ALT 21    Alkaline Phosphatase 88    Albumin 3.6    Protein, Total 7.5    Globulin 3.9    A/G Ratio 0.9 (L)   Prothrombin Time (INR/PT)    Specimen: Blood, Venous   Result Value    Protime- PT 11.2    INR 1.1     Comment: INR Therapeutic Range: 2.0 to 3.0 (2.5 to 3.5 recommended for recurrent thrombotic episodes and mechanical prosthetic heart valves.)   Partial Thromboplastin Time (PTT)    Specimen: Blood, Venous   Result Value    PTT 30   Magnesium    Specimen: Blood, Venous   Result Value    Magnesium 2.1   NT proBNP    Specimen: Blood, Venous   Result Value    NT-proBNP 1,544 (H)   CBC with Automated Differential (performable only)    Specimen: Blood, Venous   Result Value    WBC 7.1    RBC 4.00    HGB 10.4 (L)    HCT 33.5  (L)    MCV 83.8    MCH 26.0    MCHC 31.0 (L)    RDW-CV 24.7 (H)    RDW-SD 70.4 (H)        NRBC 0    Neutrophil, Percent 74    Lymphocytes, Percent 11    Mono, Percent 9    Eosinophils, Percent 5    Basophils, Percent 1    Immature Granulocytes 0    Absolute Neutrophils 5.3    Absolute Lymphocytes 0.8 (L)    Absolute Monocytes 0.6    Absolute Eosinophils  0.4    Absolute Basophils 0.0    Absolute Immature Granulocytes 0.0   TROPONIN I, HIGH SENSITIVITY    Specimen: Blood, Venous   Result Value    Troponin I, High Sensitivity 13   D Dimer, Quantitative    Specimen: Blood, Venous   Result Value    D Dimer, Quantitative 0.92 (H)   Electrocardiogram 12-Lead   Result Value    Systolic Blood Pressure 201    Diastolic Blood Pressure 83    Ventricular Rate EKG/Min (BPM) 70    Atrial Rate (BPM) 70    TX-Interval (MSEC) 238    QRS-Interval (MSEC) 72    QT-Interval (MSEC) 458    QTc 494    R Axis (Degrees) 34    T Axis (Degrees) 127    REPORT TEXT      Sinus rhythm  with 1st degree AV block  ST And  T wave abnormality, consider lateral ischemia  Abnormal ECG  When compared with ECG of  26-MAY-2024 07:33,  QRS axis  shifted right  Confirmed by ZABRINA BARTLETT M.D. ([id number]),  Laura Livingston ([id number]) on 2/13/2025 1:15:41 PM     TRANSTHORACIC ECHO(TTE) COMPLETE W/ W/O IMAGING AGENT   Result Value    Interventricular Septum in End Diastole 1.9486619368655    Left Ventricular Internal Dimension in Diastole 3.3760769435595    Left Internal Dimenson in Systole 3.7238131398402    LV outflow tract 2.8036714977846    LV end diastolic posterior wall thickness 2D 1.4287333389544    ALTA LVOT Peak Gradient 2.6374628746327    DOP Calc LVOT Peak Cyrus 1.0    LVOT VTI 25.293179470698    AV Peak Gradient 8.8398828627183    AV Peak Velocity 1.4    MV Peak A Velocity 1.2    E Wave Decelaration Time 0.59040017699434    MV Peak E Velocity 1.1    MV E Wave Cyrus/E Tissue Cyrus Med 13.430057885417    Tricuspid Annular Plane Systolic Excursion  2.3710740258111    Tricuspid Valve Peak Regurgitation Velocity 3.2    Est Right Vent Systolic Pressure by Tricuspid Regurgitation Jet 44.2762752429979    Ejection Fraction 60    TV Estimated Right Arterial Pressure 3    Sinuses of Valsalva 2.363409    Ascending Aorta 2.142788    LV End Systolic Longitudinal Strain Global -19         Radiology  US VASC LOWER EXTREMITY VENOUS DUPLEX BILATERAL   Final Result   IMPRESSION: No evidence of acute deep venous thrombosis in the right or   left lower extremity.            Electronically Signed by: Hemanth Gaspar MD   Signed on: 2/13/2025 6:24 PM   Created on Workstation ID: [id number]   Signed on Workstation ID: [id number]      XR CHEST AP OR PA   Final Result   IMPRESSION:   No acute disease.      Electronically Signed by: Waldemar Roe MD   Signed on: 2/13/2025 1:34 PM   Created on Workstation ID: [id number]   Signed on Workstation ID: [id number]        Image(s) independently interpreted by myself incident to patient care along with my review of radiologist's reading.    The following medications were given emergently during department visit  Medications   sodium chloride 0.9 % injection 10 mL (10 mLs Intravenous Given 2/13/25 1729)   sodium chloride 0.9 % injection 2 mL (has no administration in time range)   sodium chloride (NORMAL SALINE) 0.9 % bolus 500 mL (has no administration in time range)   enoxaparin (LOVENOX) injection 30 mg (has no administration in time range)   acetaminophen (TYLENOL) tablet 650 mg (has no administration in time range)     Or   acetaminophen (TYLENOL) suppository 650 mg (has no administration in time range)   melatonin tablet 3 mg (has no administration in time range)   polyethylene glycol (MIRALAX) packet 17 g (has no administration in time range)   Potassium Standard Replacement Protocol (Levels 3.5 and lower) (has no administration in time range)   Magnesium Standard Replacement Protocol (has no administration in time range)    Phosphorus Standard Replacement Protocol (has no administration in time range)   pantoprazole (PROTONIX) EC tablet 40 mg (40 mg Oral Given 2/13/25 1729)   nitroGLYCERIN (NITROSTAT) sublingual tablet 0.4 mg (has no administration in time range)   furosemide (LASIX INJECT) injection 20 mg (has no administration in time range)   hydrALAZINE (APRESOLINE) injection 10 mg (10 mg Intravenous Given 2/13/25 1729)   amLODIPine (NORVASC) tablet 5 mg (has no administration in time range)   aspirin (ECOTRIN) enteric coated tablet 81 mg (has no administration in time range)   atorvastatin (LIPITOR) tablet 40 mg (has no administration in time range)   carvedilol (COREG) tablet 6.25 mg (has no administration in time range)   citalopram (CeleXA) tablet 10 mg (has no administration in time range)   levothyroxine (SYNTHROID, LEVOTHROID) tablet 25 mcg (has no administration in time range)   nitroGLYCERIN (NITROSTAT) sublingual tablet 0.4 mg (0.4 mg Sublingual Given 2/13/25 1308)   aspirin chewable 243 mg (243 mg Oral Given 2/13/25 1310)   furosemide (LASIX INJECT) injection 20 mg (20 mg Intravenous Given 2/13/25 1430)   nitroGLYCERIN topical (NITRO-BID) 2 % ointment 1 inch (1 inch Topical Given 2/13/25 1423)       Rechecks    Patient's symptoms improved after receiving nitroglycerin symptoms are now vague and intermittent.      Consults  2:14 PM -- The patient's presentation was discussed with Dr. Chong, hospitalist. Will admit.  Discussed with Dr. Lyle cardiology who saw the patient here from the clinic  MDM  Chest pain discomfort coronary disease COPD.  No wheezes the point of COPD.  Question cardiac ischemia/CHF.  Hypertension increasing afterload.  Will provide nitroglycerin additional aspirin further evaluation.  No STEMI on EKG  Patient chart has been reviewed including outside medical records available at time of registration.  This includes prior notes, imaging, lab testing. This included prior EKG.    The Patient's  medical history and presentation necessitated an extensive evaluation by myself during today's visit.      In addition emergency lab testing and imaging ordered was personally reviewed and discussed with pertinent members of the healthcare team along with the patient.      Diagnosis and plan for disposition is included above.      Diagnosis:  ED Diagnoses       Diagnosis Comment Associated Orders       Final diagnoses    Chest pain, unspecified type -- --    Acute on chronic congestive heart failure, unspecified heart failure type  (CMD) -- --    Primary hypertension -- --                 Summary of your Discharge Medications      You have not been prescribed any medications.         Follow Up:  No follow-up provider specified.   Patient was instructed to return to the ED immediately if symptoms worsen or any new unusual symptoms arise.     Santino Wing MD     Recheck on patient. Discussed with patient ED findings and plan for discharge. Patient was given ED warnings, discharge instructions, and follow up information to go home with. Patient understands and agrees with plan for discharge. Any questions have been answered.      Closure:  The patient understands that this is a provisional diagnosis. Provisional diagnosis can and do change. The diagnosis that you are discharged with today is based on the symptoms with which you presented today. If any new symptoms occur or worsen, you should seek immediate attention for re-evaluation.  Any symptoms that persist or fail to completely resolve require further evaluation by your other healthcare provider(s).           Satnino Wing MD  02/13/25 1941     Vaccine status unknown